# Patient Record
Sex: FEMALE | Race: WHITE | Employment: OTHER | URBAN - METROPOLITAN AREA
[De-identification: names, ages, dates, MRNs, and addresses within clinical notes are randomized per-mention and may not be internally consistent; named-entity substitution may affect disease eponyms.]

---

## 2018-07-02 ENCOUNTER — OFFICE VISIT (OUTPATIENT)
Dept: FAMILY MEDICINE | Facility: CLINIC | Age: 23
End: 2018-07-02

## 2018-07-02 VITALS
HEIGHT: 69 IN | BODY MASS INDEX: 23.3 KG/M2 | SYSTOLIC BLOOD PRESSURE: 110 MMHG | WEIGHT: 157.3 LBS | RESPIRATION RATE: 16 BRPM | HEART RATE: 108 BPM | TEMPERATURE: 98 F | DIASTOLIC BLOOD PRESSURE: 66 MMHG | OXYGEN SATURATION: 100 %

## 2018-07-02 DIAGNOSIS — Z02.89 HISTORY AND PHYSICAL EXAMINATION, IMMIGRATION: Primary | ICD-10-CM

## 2018-07-02 PROCEDURE — 86708 HEPATITIS A ANTIBODY: CPT | Performed by: FAMILY MEDICINE

## 2018-07-02 PROCEDURE — 86787 VARICELLA-ZOSTER ANTIBODY: CPT | Performed by: FAMILY MEDICINE

## 2018-07-02 PROCEDURE — 36415 COLL VENOUS BLD VENIPUNCTURE: CPT | Performed by: FAMILY MEDICINE

## 2018-07-02 PROCEDURE — 87591 N.GONORRHOEAE DNA AMP PROB: CPT | Performed by: FAMILY MEDICINE

## 2018-07-02 PROCEDURE — 86480 TB TEST CELL IMMUN MEASURE: CPT | Performed by: FAMILY MEDICINE

## 2018-07-02 PROCEDURE — 86780 TREPONEMA PALLIDUM: CPT | Performed by: FAMILY MEDICINE

## 2018-07-02 PROCEDURE — 99385 PREV VISIT NEW AGE 18-39: CPT | Performed by: FAMILY MEDICINE

## 2018-07-02 RX ORDER — DESOGESTREL AND ETHINYL ESTRADIOL 0.15-0.03
1 KIT ORAL DAILY
COMMUNITY

## 2018-07-02 RX ORDER — MULTIPLE VITAMINS W/ MINERALS TAB 9MG-400MCG
1 TAB ORAL DAILY
COMMUNITY

## 2018-07-02 ASSESSMENT — PAIN SCALES - GENERAL: PAINLEVEL: NO PAIN (0)

## 2018-07-02 NOTE — PROGRESS NOTES
Nancy is here for INS physical exam.  She moved from [facility]  in 2012. She lives in [place], [place] and is not working. Stated that overall she is doing well and has no concern today.  Denies of headache or dizziness.  No URI symptoms and denies of CP or SOB.  No fever or chill. No N/V/D/C.  No history of any of STI and denies any risk for it.  No abnormal vaginal discharge nor having rash in the groin area.  Denies of depression and has no history of depression, anxiety or mood disorder. No history or current homicidal or suicidal ideation.  Denies of hallucination and has no history of psychiatric hospitalization.  No pain and has no problem with sleeping.  Eating and drinking ok.  No problem with urination.  Denies of coughing or nigth sweat. No weight loss.  No exposure to TB or history of TB.  Has not had a positive PPD. Denies of using any kind of drug.      Problem list and histories reviewed & adjusted, as indicated.  Additional history: as documented      PAST MEDICAL HISTORY:   Past Medical History:   Diagnosis Date     NO ACTIVE PROBLEMS        PAST SURGICAL HISTORY:   Past Surgical History:   Procedure Laterality Date     NO HISTORY OF SURGERY         FAMILY HISTORY:   Family History   Problem Relation Age of Onset     No Known Problems Mother      No Known Problems Father      No Known Problems Maternal Grandmother      No Known Problems Maternal Grandfather      No Known Problems Paternal Grandmother      Other Cancer Paternal Grandfather      unclear     No Known Problems Brother        SOCIAL HISTORY:   Social History   Substance Use Topics     Smoking status: Never Smoker     Smokeless tobacco: Never Used     Alcohol use Yes      Comment: socially        No Known Allergies    Current Outpatient Prescriptions   Medication Sig Dispense Refill     desogestrel-ethinyl estradiol (RECLIPSEN) 0.15-30 MG-MCG per tablet Take 1 tablet by mouth daily       multivitamin, therapeutic with minerals  "(MULTI-VITAMIN) TABS tablet Take 1 tablet by mouth daily           ROS:  Constitutional, HEENT, cardiovascular, pulmonary, gi and gu systems are negative, except as otherwise noted.      OBJECTIVE:                                                      .Vitals: /66 (BP Location: Right arm, Patient Position: Sitting, Cuff Size: Adult Regular)  Pulse 108  Temp 98  F (36.7  C) (Temporal)  Resp 16  Ht 5' 9\" (1.753 m)  Wt 157 lb 4.8 oz (71.4 kg)  LMP 06/25/2018  SpO2 100%  Breastfeeding? No  BMI 23.23 kg/m2  BMI= Body mass index is 23.23 kg/(m^2).   GENERAL: healthy, alert and no distress  EYES: Eyes grossly normal to inspection, PERRL and conjunctivae and sclerae normal  HENT: ear canals and TM's normal, nose and mouth without ulcers or lesions.  Nares are non-congested. Oropharynx is pink and moist. No tender with palpation to the sinuses.  NECK: no adenopathy, no palpable masses, or scars and thyroid normal to palpation  RESP: lungs clear to auscultation - no rales, rhonchi or wheezes  CV: regular rate and rhythm, no murmur, no peripheral edema and peripheral pulses strong  ABDOMEN: soft, nontender, no hepatosplenomegaly or palpable masses and bowel sounds normal  MS: no gross musculoskeletal defects noted, no edema. Walk with no limping, normal gait. All 4 extremities are equally in strength. Ankle, knees, hips, shoulders, elbows and wrists exams normal. Normal fine motor skills on fingers. Back is straight, no lordosis or scoliosis. No tender with palpation.  SKIN: no suspicious lesions or rashes  NEURO: Normal strength and tone, mentation intact and speech normal  PSYCH: mentation appears normal, affect normal/bright. No hallucination, suicidal or homicidal ideation    Diagnostic Test Results:      No results found for this or any previous visit.       ASSESSMENT/PLAN:                                                      1. Health examination of defined subpopulation  I personal reviewed the report of " Medical Examination and Vaccination Record from the Department of [employer]. She has no chronic medical condition. Has not had a positive PPD test and she displayed no symptoms of active TB.  No exposure to TB that she knows of.  Will check QuantiFERON level. Denies any risk for STI, but will check for RPR (syphyllis) and gonorrhea.  She has no history of depression, anxiety, mood disorder and has never been diagnosed or hospitalized for any psychiatric disorders.  She displays no physical or mental disorders with associated harmful behavior. She denies of using drug. Reviewed her immunization record, labs as ordered.  Will fill out her paper on her behalf once the results are available. Instructed not to open the sealed envelope.  All of her questions were answered and encourage to call in if has any concern.    Also inform her that she should follow up with her primary provider for her routine and chronic medical care. I did not address any chronic medical problem today.  She understands.      ICD-10-CM    1. History and physical examination, immigration Z02.89 C IMMIGRATION PHYSICAL     Hepatitis A Antibody IgG     M Tuberculosis by Quantiferon     Neisseria gonorrhoeae PCR     Varicella Zoster Virus Antibody IgG     Treponema Abs w Reflex to RPR and Titer         F/U as needed.    Tess Aquino Mai, MD  [facility]

## 2018-07-02 NOTE — MR AVS SNAPSHOT
"              After Visit Summary   7/2/2018    Nancy Villalobos    MRN: [id number]           Patient Information     Date Of Birth          1995        Visit Information        Provider Department      7/2/2018 9:40 AM Tess Boykin MD [facility]        Today's Diagnoses     History and physical examination, immigration    -  1       Follow-ups after your visit        Follow-up notes from your care team     Return if symptoms worsen or fail to improve.      Who to contact     If you have questions or need follow up information about today's clinic visit or your schedule please contact [facility] directly at [phone].  Normal or non-critical lab and imaging results will be communicated to you by MyChart, letter or phone within 4 business days after the clinic has received the results. If you do not hear from us within 7 days, please contact the clinic through [facility] or phone. If you have a critical or abnormal lab result, we will notify you by phone as soon as possible.  Submit refill requests through [facility] or call your pharmacy and they will forward the refill request to us. Please allow 3 business days for your refill to be completed.          Additional Information About Your Visit        MyChart Information     [facility] gives you secure access to your electronic health record. If you see a primary care provider, you can also send messages to your care team and make appointments. If you have questions, please call your primary care clinic.  If you do not have a primary care provider, please call [phone] and they will assist you.        Care EveryWhere ID     This is your Care EveryWhere ID. This could be used by other organizations to access your [facility] medical records  [id number]        Your Vitals Were     Pulse Temperature Respirations Height Last Period Pulse Oximetry    108 98  F (36.7  C) (Temporal) 16 5' 9\" (1.753 m) 06/25/2018 100%    Breastfeeding? BMI " (Body Mass Index)                No 23.23 kg/m2           Blood Pressure from Last 3 Encounters:   07/02/18 110/66    Weight from Last 3 Encounters:   07/02/18 157 lb 4.8 oz (71.4 kg)              We Performed the Following     C IMMIGRATION PHYSICAL     Hepatitis A Antibody IgG     M Tuberculosis by Quantiferon     Neisseria gonorrhoeae PCR     Treponema Abs w Reflex to RPR and Titer     Varicella Zoster Virus Antibody IgG        Primary Care Provider Fax #    Physician No Ref-Primary [phone]       No address on file        Equal Access to Services     WILLIAM POWER : Hadii aad ku hadasho Soomaali, waaxda luqadaha, qaybta kaalmada adeegyada, waxay idiin hayaan adealiyah maysberthanubia reno . So [facility] [phone].    ATENCIÓN: Si habla español, tiene a myers disposición servicios gratuitos de asistencia lingüística. Llame al [phone].    We comply with applicable federal civil rights laws and [place] laws. We do not discriminate on the basis of race, color, national origin, age, disability, sex, sexual orientation, or gender identity.            Thank you!     Thank you for choosing [facility]  for your care. Our goal is always to provide you with excellent care. Hearing back from our patients is one way we can continue to improve our services. Please take a few minutes to complete the written survey that you may receive in the mail after your visit with us. Thank you!             Your Updated Medication List - Protect others around you: Learn how to safely use, store and throw away your medicines at www.disposemymeds.org.          This list is accurate as of 7/2/18  2:38 PM.  Always use your most recent med list.                   Brand Name Dispense Instructions for use Diagnosis    Multi-vitamin Tabs tablet      Take 1 tablet by mouth daily        RECLIPSEN 0.15-30 MG-MCG per tablet   Generic drug:  desogestrel-ethinyl estradiol      Take 1 tablet by mouth daily

## 2018-07-03 LAB
HAV IGG SER QL IA: NONREACTIVE
M TB TUBERC IFN-G BLD QL: NEGATIVE
M TB TUBERC IFN-G/MITOGEN IGNF BLD: 0 IU/ML
N GONORRHOEA DNA SPEC QL NAA+PROBE: NEGATIVE
SPECIMEN SOURCE: NORMAL
T PALLIDUM AB SER QL: NONREACTIVE
VZV IGG SER QL IA: 0.5 AI (ref 0–0.8)

## 2018-07-04 ENCOUNTER — HEALTH MAINTENANCE LETTER (OUTPATIENT)
Age: 23
End: 2018-07-04

## 2018-07-06 ENCOUNTER — TELEPHONE (OUTPATIENT)
Dept: FAMILY MEDICINE | Facility: CLINIC | Age: 23
End: 2018-07-06

## 2018-07-06 NOTE — TELEPHONE ENCOUNTER
Entered by Tess Boykin MD at 7/3/2018  6:18 PM   Dear Nancy,   Your labs showed that you have not immunized to chickenpox and hepatitis A.  Please get the first doses of their series done as soon as possible for INS paper.  Please let me know once you get it done.  Your tuberculosis screening test was negative.  As suspected, your gonorrhea and syphilis screening tests were also negative.  Please let me know if you have any further questions.  Thank you.  Tess Boykin

## 2018-07-09 ENCOUNTER — ALLIED HEALTH/NURSE VISIT (OUTPATIENT)
Dept: FAMILY MEDICINE | Facility: CLINIC | Age: 23
End: 2018-07-09

## 2018-07-09 DIAGNOSIS — Z23 NEED FOR VACCINATION: Primary | ICD-10-CM

## 2018-07-09 PROCEDURE — 90472 IMMUNIZATION ADMIN EACH ADD: CPT

## 2018-07-09 PROCEDURE — 99207 ZZC NO CHARGE LOS: CPT

## 2018-07-09 PROCEDURE — 90632 HEPA VACCINE ADULT IM: CPT

## 2018-07-09 PROCEDURE — 90716 VAR VACCINE LIVE SUBQ: CPT

## 2018-07-09 PROCEDURE — 90471 IMMUNIZATION ADMIN: CPT

## 2018-07-09 NOTE — NURSING NOTE
Chief Complaint   Patient presents with     Imm/Inj     Hep A and Varicella     Screening Questionnaire for Adult Immunization    Are you sick today?   No   Do you have allergies to medications, food, a vaccine component or latex?   No   Have you ever had a serious reaction after receiving a vaccination?   No   Do you have a long-term health problem with heart disease, lung disease, asthma, kidney disease, metabolic disease (e.g. diabetes), anemia, or other blood disorder?   No   Do you have cancer, leukemia, HIV/AIDS, or any other immune system problem?   No   In the past 3 months, have you taken medications that affect  your immune system, such as prednisone, other steroids, or anticancer drugs; drugs for the treatment of rheumatoid arthritis, Crohn s disease, or psoriasis; or have you had radiation treatments?   No   Have you had a seizure, or a brain or other nervous system problem?   No   During the past year, have you received a transfusion of blood or blood     products, or been given immune (gamma) globulin or antiviral drug?   No   For women: Are you pregnant or is there a chance you could become        pregnant during the next month?   No   Have you received any vaccinations in the past 4 weeks?   No     Immunization questionnaire answers were all negative.        Per orders of Dr. Boykin, injection of Varicella, Hep A given by Verónica Asif. Patient instructed to remain in clinic for 15 minutes afterwards, and to report any adverse reaction to me immediately.       Screening performed by Verónica Asif on 7/9/2018 at 8:58 AM.  Prior to injection verified patient identity using patient's name and date of birth.  Due to injection administration, patient instructed to remain in clinic for 15 minutes  afterwards, and to report any adverse reaction to me immediately.    Maryjane Asif MA 7/9/2018

## 2018-07-09 NOTE — TELEPHONE ENCOUNTER
Patient came in for Immunizations and picked up INS paperwork before she left.     Maryjane Asif MA 7/9/2018

## 2018-07-09 NOTE — MR AVS SNAPSHOT
After Visit Summary   7/9/2018    aNncy Villalobos    MRN: [id number]           Patient Information     Date Of Birth          1995        Visit Information        Provider Department      7/9/2018 9:00 AM [facility]        Today's Diagnoses     Need for vaccination    -  1       Follow-ups after your visit        Your next 10 appointments already scheduled     Jul 09, 2018  9:00 AM CDT   Nurse Only with [facility] ([facility])    [address] [place] [address] [id number] [phone]              Who to contact     If you have questions or need follow up information about today's clinic visit or your schedule please contact [facility] directly at [phone].  Normal or non-critical lab and imaging results will be communicated to you by MyChart, letter or phone within 4 business days after the clinic has received the results. If you do not hear from us within 7 days, please contact the clinic through [facility] or phone. If you have a critical or abnormal lab result, we will notify you by phone as soon as possible.  Submit refill requests through [facility] or call your pharmacy and they will forward the refill request to us. Please allow 3 business days for your refill to be completed.          Additional Information About Your Visit        MyChart Information     [facility] gives you secure access to your electronic health record. If you see a primary care provider, you can also send messages to your care team and make appointments. If you have questions, please call your primary care clinic.  If you do not have a primary care provider, please call [phone] and they will assist you.        Care EveryWhere ID     This is your Care EveryWhere ID. This could be used by other organizations to access your [facility] medical records  [id number]        Your Vitals Were     Last Period                   06/25/2018             Blood Pressure from Last 3 Encounters:   07/02/18 110/66    Weight from Last 3 Encounters:   07/02/18 157 lb 4.8 oz (71.4 kg)              We Performed the Following     1st  Administration  [[id number]]     CHICKEN POX VACCINE [[id number]]     HEPATITIS A VACCINE, ADULT  [[id number]]        Primary Care Provider Fax #    Physician No Ref-Primary [phone]       No address on file        Equal Access to Services     WILLIAM POWER : Hadii aad ku hadasho Soomaali, waaxda luqadaha, qaybta kaalmada adeegyada, waxay idiin haystuartn adealiyah mohan laWandacodey . So [facility] [phone].    ATENCIÓN: Si habla espjosé miguel, tiene a myers disposición servicios gratuitos de asistencia lingüística. Susiame al [phone].    We comply with applicable federal civil rights laws and [place] laws. We do not discriminate on the basis of race, color, national origin, age, disability, sex, sexual orientation, or gender identity.            Thank you!     Thank you for choosing [facility]  for your care. Our goal is always to provide you with excellent care. Hearing back from our patients is one way we can continue to improve our services. Please take a few minutes to complete the written survey that you may receive in the mail after your visit with us. Thank you!             Your Updated Medication List - Protect others around you: Learn how to safely use, store and throw away your medicines at www.disposemymeds.org.          This list is accurate as of 7/9/18  8:59 AM.  Always use your most recent med list.                   Brand Name Dispense Instructions for use Diagnosis    Multi-vitamin Tabs tablet      Take 1 tablet by mouth daily        RECLIPSEN 0.15-30 MG-MCG per tablet   Generic drug:  desogestrel-ethinyl estradiol      Take 1 tablet by mouth daily

## 2020-03-11 ENCOUNTER — HEALTH MAINTENANCE LETTER (OUTPATIENT)
Age: 25
End: 2020-03-11

## 2021-01-03 ENCOUNTER — HEALTH MAINTENANCE LETTER (OUTPATIENT)
Age: 26
End: 2021-01-03

## 2021-04-25 ENCOUNTER — HEALTH MAINTENANCE LETTER (OUTPATIENT)
Age: 26
End: 2021-04-25

## 2021-10-10 ENCOUNTER — HEALTH MAINTENANCE LETTER (OUTPATIENT)
Age: 26
End: 2021-10-10

## 2022-05-21 ENCOUNTER — HEALTH MAINTENANCE LETTER (OUTPATIENT)
Age: 27
End: 2022-05-21

## 2022-09-18 ENCOUNTER — HEALTH MAINTENANCE LETTER (OUTPATIENT)
Age: 27
End: 2022-09-18

## 2023-06-04 ENCOUNTER — HEALTH MAINTENANCE LETTER (OUTPATIENT)
Age: 28
End: 2023-06-04